# Patient Record
Sex: MALE | Race: WHITE | NOT HISPANIC OR LATINO | Employment: FULL TIME | URBAN - METROPOLITAN AREA
[De-identification: names, ages, dates, MRNs, and addresses within clinical notes are randomized per-mention and may not be internally consistent; named-entity substitution may affect disease eponyms.]

---

## 2021-11-23 ENCOUNTER — HOSPITAL ENCOUNTER (EMERGENCY)
Facility: HOSPITAL | Age: 36
Discharge: HOME | End: 2021-11-24
Attending: EMERGENCY MEDICINE

## 2021-11-23 DIAGNOSIS — R10.9 ABDOMINAL PAIN, UNSPECIFIED ABDOMINAL LOCATION: Primary | ICD-10-CM

## 2021-11-23 DIAGNOSIS — K59.00 CONSTIPATION, UNSPECIFIED CONSTIPATION TYPE: ICD-10-CM

## 2021-11-23 DIAGNOSIS — K29.00 ACUTE GASTRITIS WITHOUT HEMORRHAGE, UNSPECIFIED GASTRITIS TYPE: ICD-10-CM

## 2021-11-23 PROCEDURE — 36415 COLL VENOUS BLD VENIPUNCTURE: CPT

## 2021-11-23 PROCEDURE — 99283 EMERGENCY DEPT VISIT LOW MDM: CPT

## 2021-11-23 RX ORDER — ONDANSETRON 4 MG/1
4 TABLET, ORALLY DISINTEGRATING ORAL ONCE
Status: COMPLETED | OUTPATIENT
Start: 2021-11-24 | End: 2021-11-24

## 2021-11-23 RX ORDER — ALUMINA, MAGNESIA, AND SIMETHICONE 2400; 2400; 240 MG/30ML; MG/30ML; MG/30ML
15 SUSPENSION ORAL ONCE
Status: COMPLETED | OUTPATIENT
Start: 2021-11-24 | End: 2021-11-24

## 2021-11-23 RX ORDER — LIDOCAINE HYDROCHLORIDE 20 MG/ML
15 SOLUTION OROPHARYNGEAL ONCE
Status: COMPLETED | OUTPATIENT
Start: 2021-11-24 | End: 2021-11-24

## 2021-11-24 ENCOUNTER — HOSPITAL ENCOUNTER (EMERGENCY)
Facility: HOSPITAL | Age: 36
Discharge: HOME | End: 2021-11-24
Attending: EMERGENCY MEDICINE | Admitting: EMERGENCY MEDICINE

## 2021-11-24 ENCOUNTER — APPOINTMENT (OUTPATIENT)
Dept: GENERAL RADIOLOGY | Facility: HOSPITAL | Age: 36
End: 2021-11-24

## 2021-11-24 VITALS
DIASTOLIC BLOOD PRESSURE: 79 MMHG | HEART RATE: 82 BPM | TEMPERATURE: 97.8 F | HEIGHT: 74 IN | SYSTOLIC BLOOD PRESSURE: 139 MMHG | RESPIRATION RATE: 18 BRPM | WEIGHT: 315 LBS | OXYGEN SATURATION: 97 % | BODY MASS INDEX: 40.43 KG/M2

## 2021-11-24 VITALS
SYSTOLIC BLOOD PRESSURE: 149 MMHG | RESPIRATION RATE: 20 BRPM | OXYGEN SATURATION: 92 % | BODY MASS INDEX: 40.43 KG/M2 | TEMPERATURE: 98 F | DIASTOLIC BLOOD PRESSURE: 95 MMHG | WEIGHT: 315 LBS | HEART RATE: 102 BPM | HEIGHT: 74 IN

## 2021-11-24 DIAGNOSIS — R10.13 EPIGASTRIC PAIN: Primary | ICD-10-CM

## 2021-11-24 LAB
ALBUMIN SERPL-MCNC: 4.6 G/DL (ref 3.5–5.2)
ALBUMIN SERPL-MCNC: 4.7 G/DL (ref 3.5–5.2)
ALBUMIN/GLOB SERPL: 1.6 G/DL
ALBUMIN/GLOB SERPL: 1.7 G/DL
ALP SERPL-CCNC: 56 U/L (ref 39–117)
ALP SERPL-CCNC: 60 U/L (ref 39–117)
ALT SERPL W P-5'-P-CCNC: 51 U/L (ref 1–41)
ALT SERPL W P-5'-P-CCNC: 53 U/L (ref 1–41)
ANION GAP SERPL CALCULATED.3IONS-SCNC: 11.8 MMOL/L (ref 5–15)
ANION GAP SERPL CALCULATED.3IONS-SCNC: 13.8 MMOL/L (ref 5–15)
AST SERPL-CCNC: 25 U/L (ref 1–40)
AST SERPL-CCNC: 27 U/L (ref 1–40)
BASOPHILS # BLD AUTO: 0.03 10*3/MM3 (ref 0–0.2)
BASOPHILS # BLD AUTO: 0.04 10*3/MM3 (ref 0–0.2)
BASOPHILS NFR BLD AUTO: 0.3 % (ref 0–1.5)
BASOPHILS NFR BLD AUTO: 0.4 % (ref 0–1.5)
BILIRUB SERPL-MCNC: 0.4 MG/DL (ref 0–1.2)
BILIRUB SERPL-MCNC: 0.5 MG/DL (ref 0–1.2)
BUN SERPL-MCNC: 15 MG/DL (ref 6–20)
BUN SERPL-MCNC: 17 MG/DL (ref 6–20)
BUN/CREAT SERPL: 14.2 (ref 7–25)
BUN/CREAT SERPL: 16.3 (ref 7–25)
CALCIUM SPEC-SCNC: 9.2 MG/DL (ref 8.6–10.5)
CALCIUM SPEC-SCNC: 9.2 MG/DL (ref 8.6–10.5)
CHLORIDE SERPL-SCNC: 104 MMOL/L (ref 98–107)
CHLORIDE SERPL-SCNC: 104 MMOL/L (ref 98–107)
CO2 SERPL-SCNC: 21.2 MMOL/L (ref 22–29)
CO2 SERPL-SCNC: 21.2 MMOL/L (ref 22–29)
CREAT SERPL-MCNC: 1.04 MG/DL (ref 0.76–1.27)
CREAT SERPL-MCNC: 1.06 MG/DL (ref 0.76–1.27)
DEPRECATED RDW RBC AUTO: 40.2 FL (ref 37–54)
DEPRECATED RDW RBC AUTO: 43 FL (ref 37–54)
EOSINOPHIL # BLD AUTO: 0.06 10*3/MM3 (ref 0–0.4)
EOSINOPHIL # BLD AUTO: 0.14 10*3/MM3 (ref 0–0.4)
EOSINOPHIL NFR BLD AUTO: 0.6 % (ref 0.3–6.2)
EOSINOPHIL NFR BLD AUTO: 1.5 % (ref 0.3–6.2)
ERYTHROCYTE [DISTWIDTH] IN BLOOD BY AUTOMATED COUNT: 12.2 % (ref 12.3–15.4)
ERYTHROCYTE [DISTWIDTH] IN BLOOD BY AUTOMATED COUNT: 12.4 % (ref 12.3–15.4)
GFR SERPL CREATININE-BSD FRML MDRD: 80 ML/MIN/1.73
GFR SERPL CREATININE-BSD FRML MDRD: 81 ML/MIN/1.73
GLOBULIN UR ELPH-MCNC: 2.8 GM/DL
GLOBULIN UR ELPH-MCNC: 2.9 GM/DL
GLUCOSE SERPL-MCNC: 122 MG/DL (ref 65–99)
GLUCOSE SERPL-MCNC: 139 MG/DL (ref 65–99)
H PYLORI IGG SER IA-ACNC: NEGATIVE
HCT VFR BLD AUTO: 45.8 % (ref 37.5–51)
HCT VFR BLD AUTO: 46.4 % (ref 37.5–51)
HGB BLD-MCNC: 16 G/DL (ref 13–17.7)
HGB BLD-MCNC: 16.2 G/DL (ref 13–17.7)
IMM GRANULOCYTES # BLD AUTO: 0.02 10*3/MM3 (ref 0–0.05)
IMM GRANULOCYTES # BLD AUTO: 0.03 10*3/MM3 (ref 0–0.05)
IMM GRANULOCYTES NFR BLD AUTO: 0.2 % (ref 0–0.5)
IMM GRANULOCYTES NFR BLD AUTO: 0.3 % (ref 0–0.5)
LIPASE SERPL-CCNC: 24 U/L (ref 13–60)
LIPASE SERPL-CCNC: 33 U/L (ref 13–60)
LYMPHOCYTES # BLD AUTO: 0.74 10*3/MM3 (ref 0.7–3.1)
LYMPHOCYTES # BLD AUTO: 1.62 10*3/MM3 (ref 0.7–3.1)
LYMPHOCYTES NFR BLD AUTO: 17.5 % (ref 19.6–45.3)
LYMPHOCYTES NFR BLD AUTO: 7.2 % (ref 19.6–45.3)
MCH RBC QN AUTO: 32 PG (ref 26.6–33)
MCH RBC QN AUTO: 32.5 PG (ref 26.6–33)
MCHC RBC AUTO-ENTMCNC: 34.5 G/DL (ref 31.5–35.7)
MCHC RBC AUTO-ENTMCNC: 35.4 G/DL (ref 31.5–35.7)
MCV RBC AUTO: 90.3 FL (ref 79–97)
MCV RBC AUTO: 94.3 FL (ref 79–97)
MONOCYTES # BLD AUTO: 0.69 10*3/MM3 (ref 0.1–0.9)
MONOCYTES # BLD AUTO: 0.91 10*3/MM3 (ref 0.1–0.9)
MONOCYTES NFR BLD AUTO: 7.4 % (ref 5–12)
MONOCYTES NFR BLD AUTO: 8.9 % (ref 5–12)
NEUTROPHILS NFR BLD AUTO: 6.77 10*3/MM3 (ref 1.7–7)
NEUTROPHILS NFR BLD AUTO: 73 % (ref 42.7–76)
NEUTROPHILS NFR BLD AUTO: 8.47 10*3/MM3 (ref 1.7–7)
NEUTROPHILS NFR BLD AUTO: 82.7 % (ref 42.7–76)
NRBC BLD AUTO-RTO: 0 /100 WBC (ref 0–0.2)
NRBC BLD AUTO-RTO: 0 /100 WBC (ref 0–0.2)
PLATELET # BLD AUTO: 186 10*3/MM3 (ref 140–450)
PLATELET # BLD AUTO: 197 10*3/MM3 (ref 140–450)
PMV BLD AUTO: 10.4 FL (ref 6–12)
PMV BLD AUTO: 10.5 FL (ref 6–12)
POTASSIUM SERPL-SCNC: 3.6 MMOL/L (ref 3.5–5.2)
POTASSIUM SERPL-SCNC: 4 MMOL/L (ref 3.5–5.2)
PROT SERPL-MCNC: 7.5 G/DL (ref 6–8.5)
PROT SERPL-MCNC: 7.5 G/DL (ref 6–8.5)
RBC # BLD AUTO: 4.92 10*6/MM3 (ref 4.14–5.8)
RBC # BLD AUTO: 5.07 10*6/MM3 (ref 4.14–5.8)
SODIUM SERPL-SCNC: 137 MMOL/L (ref 136–145)
SODIUM SERPL-SCNC: 139 MMOL/L (ref 136–145)
WBC NRBC COR # BLD: 10.24 10*3/MM3 (ref 3.4–10.8)
WBC NRBC COR # BLD: 9.28 10*3/MM3 (ref 3.4–10.8)

## 2021-11-24 PROCEDURE — 80053 COMPREHEN METABOLIC PANEL: CPT | Performed by: NURSE PRACTITIONER

## 2021-11-24 PROCEDURE — 25010000002 DIPHENHYDRAMINE PER 50 MG: Performed by: NURSE PRACTITIONER

## 2021-11-24 PROCEDURE — 25010000002 DROPERIDOL PER 5 MG: Performed by: NURSE PRACTITIONER

## 2021-11-24 PROCEDURE — 86677 HELICOBACTER PYLORI ANTIBODY: CPT | Performed by: NURSE PRACTITIONER

## 2021-11-24 PROCEDURE — 96375 TX/PRO/DX INJ NEW DRUG ADDON: CPT

## 2021-11-24 PROCEDURE — 25010000002 METOCLOPRAMIDE PER 10 MG: Performed by: NURSE PRACTITIONER

## 2021-11-24 PROCEDURE — 74019 RADEX ABDOMEN 2 VIEWS: CPT

## 2021-11-24 PROCEDURE — 63710000001 ONDANSETRON ODT 4 MG TABLET DISPERSIBLE: Performed by: EMERGENCY MEDICINE

## 2021-11-24 PROCEDURE — 96374 THER/PROPH/DIAG INJ IV PUSH: CPT

## 2021-11-24 PROCEDURE — 85025 COMPLETE CBC W/AUTO DIFF WBC: CPT | Performed by: NURSE PRACTITIONER

## 2021-11-24 PROCEDURE — 96361 HYDRATE IV INFUSION ADD-ON: CPT

## 2021-11-24 PROCEDURE — 83690 ASSAY OF LIPASE: CPT | Performed by: EMERGENCY MEDICINE

## 2021-11-24 PROCEDURE — 99284 EMERGENCY DEPT VISIT MOD MDM: CPT

## 2021-11-24 PROCEDURE — 83690 ASSAY OF LIPASE: CPT | Performed by: NURSE PRACTITIONER

## 2021-11-24 PROCEDURE — 85025 COMPLETE CBC W/AUTO DIFF WBC: CPT | Performed by: EMERGENCY MEDICINE

## 2021-11-24 PROCEDURE — 80053 COMPREHEN METABOLIC PANEL: CPT | Performed by: EMERGENCY MEDICINE

## 2021-11-24 RX ORDER — DICYCLOMINE HCL 20 MG
20 TABLET ORAL EVERY 6 HOURS
Qty: 20 TABLET | Refills: 0 | Status: SHIPPED | OUTPATIENT
Start: 2021-11-24

## 2021-11-24 RX ORDER — PANTOPRAZOLE SODIUM 40 MG/1
40 TABLET, DELAYED RELEASE ORAL DAILY
Qty: 30 TABLET | Refills: 0 | Status: SHIPPED | OUTPATIENT
Start: 2021-11-24 | End: 2021-12-24

## 2021-11-24 RX ORDER — DICYCLOMINE HYDROCHLORIDE 10 MG/1
20 CAPSULE ORAL ONCE
Status: DISCONTINUED | OUTPATIENT
Start: 2021-11-24 | End: 2021-11-24 | Stop reason: HOSPADM

## 2021-11-24 RX ORDER — METOCLOPRAMIDE HYDROCHLORIDE 5 MG/ML
10 INJECTION INTRAMUSCULAR; INTRAVENOUS ONCE
Status: COMPLETED | OUTPATIENT
Start: 2021-11-24 | End: 2021-11-24

## 2021-11-24 RX ORDER — ONDANSETRON 8 MG/1
8 TABLET, ORALLY DISINTEGRATING ORAL EVERY 8 HOURS PRN
Qty: 30 TABLET | Refills: 0 | Status: SHIPPED | OUTPATIENT
Start: 2021-11-24 | End: 2021-12-04

## 2021-11-24 RX ORDER — DIPHENHYDRAMINE HYDROCHLORIDE 50 MG/ML
25 INJECTION INTRAMUSCULAR; INTRAVENOUS ONCE
Status: COMPLETED | OUTPATIENT
Start: 2021-11-24 | End: 2021-11-24

## 2021-11-24 RX ORDER — DROPERIDOL 2.5 MG/ML
2.5 INJECTION, SOLUTION INTRAMUSCULAR; INTRAVENOUS ONCE
Status: COMPLETED | OUTPATIENT
Start: 2021-11-24 | End: 2021-11-24

## 2021-11-24 RX ORDER — FAMOTIDINE 20 MG/1
20 TABLET, FILM COATED ORAL 2 TIMES DAILY
Qty: 30 TABLET | Refills: 0 | Status: SHIPPED | OUTPATIENT
Start: 2021-11-24 | End: 2021-12-09

## 2021-11-24 RX ORDER — DOCUSATE SODIUM 100 MG/1
100 CAPSULE, LIQUID FILLED ORAL 2 TIMES DAILY
Qty: 10 CAPSULE | Refills: 0 | Status: SHIPPED | OUTPATIENT
Start: 2021-11-24 | End: 2021-11-29

## 2021-11-24 RX ADMIN — Medication 300 ML: at 06:46

## 2021-11-24 RX ADMIN — METOCLOPRAMIDE 10 MG: 5 INJECTION, SOLUTION INTRAMUSCULAR; INTRAVENOUS at 05:54

## 2021-11-24 RX ADMIN — DIPHENHYDRAMINE HYDROCHLORIDE 25 MG: 50 INJECTION, SOLUTION INTRAMUSCULAR; INTRAVENOUS at 05:54

## 2021-11-24 RX ADMIN — LIDOCAINE HYDROCHLORIDE 15 ML: 20 SOLUTION ORAL; TOPICAL at 01:09

## 2021-11-24 RX ADMIN — DROPERIDOL 2.5 MG: 2.5 INJECTION, SOLUTION INTRAMUSCULAR; INTRAVENOUS at 05:53

## 2021-11-24 RX ADMIN — ALUMINUM HYDROXIDE, MAGNESIUM HYDROXIDE, AND DIMETHICONE 15 ML: 400; 400; 40 SUSPENSION ORAL at 01:16

## 2021-11-24 RX ADMIN — ONDANSETRON 4 MG: 4 TABLET, ORALLY DISINTEGRATING ORAL at 01:17

## 2021-11-24 RX ADMIN — SODIUM CHLORIDE 1000 ML: 9 INJECTION, SOLUTION INTRAVENOUS at 05:53

## 2021-11-24 NOTE — ED PROVIDER NOTES
Time: 07:49 EST  Arrived by: Private vehicle  Chief Complaint: abdominal pain  History provided by: Patient  History is limited by: N/A    History of Present Illness:  Patient is a 36 y.o. year old male that presents to the emergency department with upper abdomen pain and bloating      History provided by:  Patient  Abdominal Pain  Pain location:  Epigastric  Pain quality: aching, bloating and cramping    Pain radiates to:  Does not radiate  Pain severity:  Moderate  Onset quality:  Unable to specify  Duration:  10 hours  Timing:  Intermittent  Progression:  Waxing and waning  Chronicity:  New  Context comment:  Last night patient had sudden onset of abdominal pain and was seen in the emergency department seem to improve after medications.  Left and states he tried to do an enema and had 2 small bowel movements but vomited twice and pain did not go so came back in  Relieved by:  Nothing  Worsened by:  Nothing  Ineffective treatments: Patient had Pepcid and GI cocktail here which seemed to improve while he was having his visit then went home and used an enema with a small bowel movement as a result and got no better.  Associated symptoms: constipation ( Seen on x-ray earlier) and vomiting    Associated symptoms: no chest pain, no chills, no cough, no diarrhea, no dysuria, no fever, no hematuria, no nausea, no shortness of breath and no sore throat    Vomiting  The primary symptoms include abdominal pain and vomiting. Primary symptoms do not include fever, nausea, diarrhea or dysuria.   The illness is also significant for constipation ( Seen on x-ray earlier). The illness does not include chills or back pain.           Similar Symptoms Previously: Yes patient said in the past when he had this he was given a white substance to drink and it made it better  Recently seen: Patient was seen here and discharged 3 hours ago with a diagnosis of constipation after normal lab findings and symptom improvement after Pepcid and GI  mariann      Patient Care Team  Primary Care Provider: Patient is from out of town in [place]     Past Medical History:     No Known Allergies  History reviewed. No pertinent past medical history.  Past Surgical History:   Procedure Laterality Date   • KNEE ARTHROPLASTY     • MENISCECTOMY       History reviewed. No pertinent family history.    Home Medications:  Prior to Admission medications    Medication Sig Start Date End Date Taking? Authorizing Provider   docusate sodium (COLACE) 100 MG capsule Take 1 capsule by mouth 2 (Two) Times a Day for 5 days. 11/24/21 11/29/21  Adriano Cope DO   famotidine (PEPCID) 20 MG tablet Take 1 tablet by mouth 2 (Two) Times a Day for 15 days. 11/24/21 12/9/21  Adriano Cope DO   ondansetron ODT (ZOFRAN-ODT) 8 MG disintegrating tablet Place 1 tablet on the tongue Every 8 (Eight) Hours As Needed for Nausea or Vomiting for up to 10 days. 11/24/21 12/4/21  Adriano Cope DO   pantoprazole (PROTONIX) 40 MG EC tablet Take 1 tablet by mouth Daily for 30 days. 11/24/21 12/24/21  Adriano Cope DO        Social History:   PT  reports that he has never smoked. He has never used smokeless tobacco. He reports that he does not drink alcohol and does not use drugs.    Record Review:  I have reviewed the patient's records in [facility].     Review of Systems  Review of Systems   Constitutional: Negative for chills and fever.   HENT: Negative for congestion, ear pain and sore throat.    Eyes: Negative for pain.   Respiratory: Negative for cough, chest tightness and shortness of breath.    Cardiovascular: Negative for chest pain.   Gastrointestinal: Positive for abdominal pain, constipation ( Seen on x-ray earlier) and vomiting. Negative for diarrhea and nausea.   Genitourinary: Negative for dysuria, flank pain and hematuria.   Musculoskeletal: Negative for back pain, joint swelling and neck pain.   Skin: Negative for pallor.   Neurological: Negative for seizures and headaches.   Hematological:  "Negative.    Psychiatric/Behavioral: Negative.    All other systems reviewed and are negative.       Physical Exam  /91   Pulse 102   Temp 98 °F (36.7 °C) (Oral)   Resp 20   Ht 188 cm (74\")   Wt (!) 149 kg (327 lb 6.1 oz)   SpO2 92%   BMI 42.03 kg/m²     Physical Exam  Vitals and nursing note reviewed.   Constitutional:       General: He is not in acute distress.     Appearance: Normal appearance. He is not toxic-appearing.   HENT:      Head: Normocephalic and atraumatic.      Mouth/Throat:      Mouth: Mucous membranes are moist.   Eyes:      General: No scleral icterus.  Cardiovascular:      Rate and Rhythm: Normal rate and regular rhythm.      Pulses: Normal pulses.      Heart sounds: Normal heart sounds.   Pulmonary:      Effort: Pulmonary effort is normal. No respiratory distress.      Breath sounds: Normal breath sounds.   Abdominal:      General: Abdomen is protuberant. Bowel sounds are normal.      Palpations: Abdomen is soft.      Tenderness: There is abdominal tenderness in the epigastric area. There is no right CVA tenderness or left CVA tenderness.      Hernia: No hernia is present.   Musculoskeletal:         General: Normal range of motion.      Cervical back: Normal range of motion and neck supple.   Skin:     General: Skin is warm and dry.   Neurological:      Mental Status: He is alert and oriented to person, place, and time. Mental status is at baseline.   Psychiatric:         Mood and Affect: Mood normal.         Behavior: Behavior normal.            ED Course  /91   Pulse 102   Temp 98 °F (36.7 °C) (Oral)   Resp 20   Ht 188 cm (74\")   Wt (!) 149 kg (327 lb 6.1 oz)   SpO2 92%   BMI 42.03 kg/m²   Results for orders placed or performed during the hospital encounter of 11/24/21   Comprehensive Metabolic Panel    Specimen: Blood   Result Value Ref Range    Glucose 139 (H) 65 - 99 mg/dL    BUN 15 6 - 20 mg/dL    Creatinine 1.06 0.76 - 1.27 mg/dL    Sodium 139 136 - 145 mmol/L    " Potassium 3.6 3.5 - 5.2 mmol/L    Chloride 104 98 - 107 mmol/L    CO2 21.2 (L) 22.0 - 29.0 mmol/L    Calcium 9.2 8.6 - 10.5 mg/dL    Total Protein 7.5 6.0 - 8.5 g/dL    Albumin 4.70 3.50 - 5.20 g/dL    ALT (SGPT) 53 (H) 1 - 41 U/L    AST (SGOT) 27 1 - 40 U/L    Alkaline Phosphatase 60 39 - 117 U/L    Total Bilirubin 0.5 0.0 - 1.2 mg/dL    eGFR Non African Amer 80 >60 mL/min/1.73    Globulin 2.8 gm/dL    A/G Ratio 1.7 g/dL    BUN/Creatinine Ratio 14.2 7.0 - 25.0    Anion Gap 13.8 5.0 - 15.0 mmol/L   Lipase    Specimen: Blood   Result Value Ref Range    Lipase 24 13 - 60 U/L   CBC Auto Differential    Specimen: Blood   Result Value Ref Range    WBC 10.24 3.40 - 10.80 10*3/mm3    RBC 5.07 4.14 - 5.80 10*6/mm3    Hemoglobin 16.2 13.0 - 17.7 g/dL    Hematocrit 45.8 37.5 - 51.0 %    MCV 90.3 79.0 - 97.0 fL    MCH 32.0 26.6 - 33.0 pg    MCHC 35.4 31.5 - 35.7 g/dL    RDW 12.2 (L) 12.3 - 15.4 %    RDW-SD 40.2 37.0 - 54.0 fl    MPV 10.5 6.0 - 12.0 fL    Platelets 197 140 - 450 10*3/mm3    Neutrophil % 82.7 (H) 42.7 - 76.0 %    Lymphocyte % 7.2 (L) 19.6 - 45.3 %    Monocyte % 8.9 5.0 - 12.0 %    Eosinophil % 0.6 0.3 - 6.2 %    Basophil % 0.3 0.0 - 1.5 %    Immature Grans % 0.3 0.0 - 0.5 %    Neutrophils, Absolute 8.47 (H) 1.70 - 7.00 10*3/mm3    Lymphocytes, Absolute 0.74 0.70 - 3.10 10*3/mm3    Monocytes, Absolute 0.91 (H) 0.10 - 0.90 10*3/mm3    Eosinophils, Absolute 0.06 0.00 - 0.40 10*3/mm3    Basophils, Absolute 0.03 0.00 - 0.20 10*3/mm3    Immature Grans, Absolute 0.03 0.00 - 0.05 10*3/mm3    nRBC 0.0 0.0 - 0.2 /100 WBC   H. Pylori Antibody, IgG    Specimen: Blood   Result Value Ref Range    H. pylori IgG Negative Negative, Equivocal     Medications   dicyclomine (BENTYL) capsule 20 mg (has no administration in time range)   metoclopramide (REGLAN) injection 10 mg (10 mg Intravenous Given 11/24/21 0554)   diphenhydrAMINE (BENADRYL) injection 25 mg (25 mg Intravenous Given 11/24/21 0554)   droperidol (INAPSINE) injection  2.5 mg (2.5 mg Intravenous Given 11/24/21 0553)   sodium chloride 0.9 % bolus 1,000 mL (1,000 mL Intravenous New Bag 11/24/21 0553)   molasses enema (300 mL Rectal Given 11/24/21 0646)     XR Abdomen Flat & Upright    Result Date: 11/24/2021  Narrative: PROCEDURE: XR ABDOMEN FLAT AND UPRIGHT  COMPARISON: None  INDICATIONS: EPIGASTRIC PAIN, ABDOMINAL PAIN  FINDINGS:  BOWEL GAS PATTERN: Normal.  No abnormal dilation or deviation.  Moderate stool throughout colon. CALCIFICATIONS: None significant. OTHER: Negative.  No abnormal gaseous collections.   CONCLUSION: No acute process identified.     PB DUONG MD       Electronically Signed and Approved By: PB DUONG MD on 11/24/2021 at 0:22               Medical Decision Making:                     MDM  Number of Diagnoses or Management Options  Epigastric pain  Diagnosis management comments: The patient is resting comfortably and feels better, is alert and in no distress. Repeat examination is unremarkable and benign; in particular, there's no discomfort at McBurney's point and there is no pulsatile mass. The history, exam, diagnostic testing, and current condition does not suggest acute appendicitis, bowel obstruction, acute cholecystitis, bowel perforation, major gastrointestinal bleeding, severe diverticulitis, abdominal aortic aneurysm, mesenteric ischemia, volvulus, sepsis, or other significant pathology that warrants further testing, continued ED treatment, admission, for surgical evaluation at this point. The vital signs have been stable. The patient does not have uncontrollable pain, intractable vomiting, or other significant symptoms. The patient's condition is stable and appropriate for discharge from the emergency department.         Amount and/or Complexity of Data Reviewed  Clinical lab tests: reviewed and ordered  Tests in the medicine section of CPT®: ordered and reviewed  Decide to obtain previous medical records or to obtain history from  someone other than the patient: yes  Review and summarize past medical records: yes (Visit from just 3 hours ago were reviewed.  Normal lab work.  X-ray was negative except for findings of constipation)    Risk of Complications, Morbidity, and/or Mortality  Presenting problems: moderate  Diagnostic procedures: low  Management options: low    Patient Progress  Patient progress: stable       Final diagnoses:   Epigastric pain        Disposition:  ED Disposition     ED Disposition Condition Comment    Discharge Stable            Ambreen Pennington, APRN  11/24/21 0750

## 2021-11-24 NOTE — ED PROVIDER NOTES
"Time: 11:44 PM EST  Arrived by: private car  Chief Complaint: abdominal pain  History provided by: patient  History is limited by: N/A     History of Present Illness:  Patient is a 36 y.o. year old male that presents to the emergency department with mid abdominal pain that started 3 hours ago. Pt states that he had similar symptoms 3 years ago and they gave him a \"white liquid\" which relieved his pain. Pt describes the pain as pressure \"gas\". Pt last BM was this morning which was normal. Pt denies any diarrhea, hematochezia, or melena. Pt denies any hesitancy, dysuria, urgency, or hematuria.       Abdominal Pain  Pain location: mid.  Pain quality: pressure    Pain radiates to:  Does not radiate  Pain severity:  Mild  Onset quality:  Gradual  Duration:  3 hours  Timing:  Constant  Progression:  Worsening  Chronicity:  Recurrent  Relieved by:  Nothing  Worsened by:  Nothing  Associated symptoms: no chest pain, no chills, no cough, no diarrhea, no dysuria, no fever, no hematochezia, no hematuria, no melena, no nausea, no shortness of breath and no vomiting        Similar Symptoms Previously: yes  Recently seen: no      Patient Care Team  Primary Care Provider: Provider, No Known    Past Medical History:     No Known Allergies  History reviewed. No pertinent past medical history.  Past Surgical History:   Procedure Laterality Date   • KNEE ARTHROPLASTY     • MENISCECTOMY       History reviewed. No pertinent family history.    Home Medications:  Prior to Admission medications    Not on File        Social History:   Social History     Tobacco Use   • Smoking status: Never Smoker   • Smokeless tobacco: Never Used   Vaping Use   • Vaping Use: Never used   Substance Use Topics   • Alcohol use: Never   • Drug use: Never         Review of Systems:  Review of Systems   Constitutional: Negative for chills and fever.   HENT: Negative for nosebleeds.    Eyes: Negative for redness.   Respiratory: Negative for cough and shortness of " "breath.    Cardiovascular: Negative for chest pain.   Gastrointestinal: Positive for abdominal pain. Negative for diarrhea, hematochezia, melena, nausea and vomiting.   Genitourinary: Negative for dysuria, frequency, hematuria and urgency.   Musculoskeletal: Negative for back pain and neck pain.   Skin: Negative for rash.   Neurological: Negative for seizures.        Physical Exam:  /79 (BP Location: Right arm)   Pulse 82   Temp 97.8 °F (36.6 °C) (Oral)   Resp 18   Ht 188 cm (74\")   Wt (!) 150 kg (330 lb 0.5 oz)   SpO2 97%   BMI 42.37 kg/m²     Physical Exam  Vitals and nursing note reviewed.   Constitutional:       General: He is not in acute distress.     Appearance: Normal appearance. He is not toxic-appearing.   HENT:      Head: Normocephalic and atraumatic.      Nose: Nose normal.      Mouth/Throat:      Pharynx: Oropharynx is clear.   Eyes:      General: Lids are normal. No scleral icterus.     Conjunctiva/sclera: Conjunctivae normal.   Cardiovascular:      Rate and Rhythm: Normal rate and regular rhythm.      Pulses: Normal pulses.      Heart sounds: Normal heart sounds. No murmur heard.      Pulmonary:      Effort: Pulmonary effort is normal. No respiratory distress.      Breath sounds: Normal breath sounds and air entry. No wheezing, rhonchi or rales.      Comments: Lungs are clear bilaterally.   Chest:      Chest wall: No tenderness.   Abdominal:      Palpations: Abdomen is soft.      Tenderness: There is abdominal tenderness (upper). There is no guarding or rebound. Negative signs include Burger's sign and McBurney's sign.      Comments: No rigidity.    Musculoskeletal:         General: No tenderness. Normal range of motion.      Cervical back: Normal range of motion and neck supple.      Right lower leg: No edema.      Left lower leg: No edema.   Skin:     General: Skin is warm and dry.      Findings: No rash.   Neurological:      Mental Status: He is alert and oriented to person, place, and " time. Mental status is at baseline.      Cranial Nerves: Cranial nerves are intact.      Sensory: Sensation is intact.      Motor: Motor function is intact.   Psychiatric:         Mood and Affect: Mood normal.         Behavior: Behavior normal.                Medications in the Emergency Department:  Medications   aluminum-magnesium hydroxide-simethicone (MAALOX MAX) 400-400-40 MG/5ML suspension 15 mL (15 mL Oral Given 11/24/21 0116)   Lidocaine Viscous HCl (XYLOCAINE) 2 % solution 15 mL (15 mL Mouth/Throat Given 11/24/21 0109)   ondansetron ODT (ZOFRAN-ODT) disintegrating tablet 4 mg (4 mg Oral Given 11/24/21 0117)        Labs  Lab Results (last 24 hours)     Procedure Component Value Units Date/Time    CBC & Differential [[id number]]  (Abnormal) Collected: 11/24/21 0026    Specimen: Blood Updated: 11/24/21 0037    Narrative:      The following orders were created for panel order CBC & Differential.  Procedure                               Abnormality         Status                     ---------                               -----------         ------                     CBC Auto Differential[[id number]]        Abnormal            Final result                 Please view results for these tests on the individual orders.    Comprehensive Metabolic Panel [[id number]]  (Abnormal) Collected: 11/24/21 0026    Specimen: Blood Updated: 11/24/21 0052     Glucose 122 mg/dL      BUN 17 mg/dL      Creatinine 1.04 mg/dL      Sodium 137 mmol/L      Potassium 4.0 mmol/L      Chloride 104 mmol/L      CO2 21.2 mmol/L      Calcium 9.2 mg/dL      Total Protein 7.5 g/dL      Albumin 4.60 g/dL      ALT (SGPT) 51 U/L      AST (SGOT) 25 U/L      Alkaline Phosphatase 56 U/L      Total Bilirubin 0.4 mg/dL      eGFR Non African Amer 81 mL/min/1.73      Globulin 2.9 gm/dL      A/G Ratio 1.6 g/dL      BUN/Creatinine Ratio 16.3     Anion Gap 11.8 mmol/L     Narrative:      GFR Normal >60  Chronic Kidney Disease <60  Kidney Failure <15       Lipase [[id number]]  (Normal) Collected: 11/24/21 0026    Specimen: Blood Updated: 11/24/21 0052     Lipase 33 U/L     CBC Auto Differential [[id number]]  (Abnormal) Collected: 11/24/21 0026    Specimen: Blood Updated: 11/24/21 0037     WBC 9.28 10*3/mm3      RBC 4.92 10*6/mm3      Hemoglobin 16.0 g/dL      Hematocrit 46.4 %      MCV 94.3 fL      MCH 32.5 pg      MCHC 34.5 g/dL      RDW 12.4 %      RDW-SD 43.0 fl      MPV 10.4 fL      Platelets 186 10*3/mm3      Neutrophil % 73.0 %      Lymphocyte % 17.5 %      Monocyte % 7.4 %      Eosinophil % 1.5 %      Basophil % 0.4 %      Immature Grans % 0.2 %      Neutrophils, Absolute 6.77 10*3/mm3      Lymphocytes, Absolute 1.62 10*3/mm3      Monocytes, Absolute 0.69 10*3/mm3      Eosinophils, Absolute 0.14 10*3/mm3      Basophils, Absolute 0.04 10*3/mm3      Immature Grans, Absolute 0.02 10*3/mm3      nRBC 0.0 /100 WBC     CBC & Differential [[id number]]  (Abnormal) Collected: 11/24/21 0547    Specimen: Blood Updated: 11/24/21 0623    Narrative:      The following orders were created for panel order CBC & Differential.  Procedure                               Abnormality         Status                     ---------                               -----------         ------                     CBC Auto Differential[[id number]]        Abnormal            Final result                 Please view results for these tests on the individual orders.    Comprehensive Metabolic Panel [[id number]]  (Abnormal) Collected: 11/24/21 0547    Specimen: Blood Updated: 11/24/21 0633     Glucose 139 mg/dL      BUN 15 mg/dL      Creatinine 1.06 mg/dL      Sodium 139 mmol/L      Potassium 3.6 mmol/L      Chloride 104 mmol/L      CO2 21.2 mmol/L      Calcium 9.2 mg/dL      Total Protein 7.5 g/dL      Albumin 4.70 g/dL      ALT (SGPT) 53 U/L      AST (SGOT) 27 U/L      Alkaline Phosphatase 60 U/L      Total Bilirubin 0.5 mg/dL      eGFR Non African Amer 80 mL/min/1.73      Globulin 2.8 gm/dL      A/G  Ratio 1.7 g/dL      BUN/Creatinine Ratio 14.2     Anion Gap 13.8 mmol/L     Narrative:      GFR Normal >60  Chronic Kidney Disease <60  Kidney Failure <15      Lipase [[id number]]  (Normal) Collected: 11/24/21 0547    Specimen: Blood Updated: 11/24/21 0633     Lipase 24 U/L     CBC Auto Differential [[id number]]  (Abnormal) Collected: 11/24/21 0547    Specimen: Blood Updated: 11/24/21 0623     WBC 10.24 10*3/mm3      RBC 5.07 10*6/mm3      Hemoglobin 16.2 g/dL      Hematocrit 45.8 %      MCV 90.3 fL      MCH 32.0 pg      MCHC 35.4 g/dL      RDW 12.2 %      RDW-SD 40.2 fl      MPV 10.5 fL      Platelets 197 10*3/mm3      Neutrophil % 82.7 %      Lymphocyte % 7.2 %      Monocyte % 8.9 %      Eosinophil % 0.6 %      Basophil % 0.3 %      Immature Grans % 0.3 %      Neutrophils, Absolute 8.47 10*3/mm3      Lymphocytes, Absolute 0.74 10*3/mm3      Monocytes, Absolute 0.91 10*3/mm3      Eosinophils, Absolute 0.06 10*3/mm3      Basophils, Absolute 0.03 10*3/mm3      Immature Grans, Absolute 0.03 10*3/mm3      nRBC 0.0 /100 WBC     H. Pylori Antibody, IgG [[id number]] Collected: 11/24/21 0547    Specimen: Blood Updated: 11/24/21 0553           Imaging:  XR Abdomen Flat & Upright    Result Date: 11/24/2021  PROCEDURE: XR ABDOMEN FLAT AND UPRIGHT  COMPARISON: None  INDICATIONS: EPIGASTRIC PAIN, ABDOMINAL PAIN  FINDINGS:  BOWEL GAS PATTERN: Normal.  No abnormal dilation or deviation.  Moderate stool throughout colon. CALCIFICATIONS: None significant. OTHER: Negative.  No abnormal gaseous collections.   CONCLUSION: No acute process identified.     PB DUONG MD       Electronically Signed and Approved By: PB DUONG MD on 11/24/2021 at 0:22               Procedures:  Procedures    Progress  ED Course as of 11/24/21 0636   Wed Nov 24, 2021   0110 Pt was re-evaluated in the department. Pt states that his symptoms resolved after the GI cocktail. Pt is stable for discharge.  [JW]      ED Course User Index  [JW] Gisell  Magali                            Medical Decision Making:  MDM  Number of Diagnoses or Management Options  Diagnosis management comments:   Patient reexamined, patient states he is better after the GI cocktail.    The patient is resting comfortably and feels better, is alert and in no distress.  Repeat examination is unremarkable and benign; in particular, there is no discomfort at McBurney's point and there is no pulsatile mass.  The history, exam, diagnostic testing and current condition does not suggest acute appendicitis, bowel obstruction, acute cholecystitis bowel perforation, major gastrointestinal bleeding, severe diverticulitis, abdominal aortic aneurysm, mesenteric ischemia, volvulus, sepsis or other significant pathology that warrants further testing, continued ED treatment, admission for surgical evaluation at this point.  The vital signs have been stable.  The patient does not have uncontrolled pain intractable vomiting or other significant symptoms.  The patient's condition is stable and appropriate for discharge from the emergency department.  The patient will follow up with her primary care physician for serial reexamination of the abdomen tomorrow.  The patient was instructed to return should they have worsening pain, intractable vomiting, fever or new symptoms       Amount and/or Complexity of Data Reviewed  Clinical lab tests: reviewed  Tests in the radiology section of CPT®: reviewed                 Final diagnoses:   Abdominal pain, unspecified abdominal location   Acute gastritis without hemorrhage, unspecified gastritis type   Constipation, unspecified constipation type        Disposition:  ED Disposition     ED Disposition Condition Comment    Discharge Stable           This medical record created using voice recognition software and may contain unintended errors.    Documentation assistance provided by Magali Jameson acting as scribe for Adriano Cope DO. Information recorded by the  scribe was done at my direction and has been verified and validated by me.          Magali Jameson  11/23/21 [id number]       Adriano Cope DO  11/25/21 6067

## 2021-11-24 NOTE — DISCHARGE INSTRUCTIONS
Labs unremarkable. No elevation in wbc or lipase to indicate pancreatitis as you were concerned.    Take medications previously prescribed on visit earlier this evening    Follow-up with PCP or GI MD in [place] for reevaluation and possible EGD for evaluation of pain    Return for new symptoms

## 2021-11-24 NOTE — DISCHARGE INSTRUCTIONS
Clear liquids for the next 12 hours, advance diet slowly  Follow-up with your primary care physician tomorrow for serial reexamination of the abdomen  Discussed possible need for gastroenterology referral with your primary care physician  Return for intractable pain, vomiting, chest pain, chest pressure, shortness of breath, near passing out, passing out, extreme fatigue, unusual sweating or any new symptoms that you are concerned about